# Patient Record
Sex: FEMALE | Race: WHITE | NOT HISPANIC OR LATINO | URBAN - METROPOLITAN AREA
[De-identification: names, ages, dates, MRNs, and addresses within clinical notes are randomized per-mention and may not be internally consistent; named-entity substitution may affect disease eponyms.]

---

## 2020-08-31 ENCOUNTER — OFFICE (OUTPATIENT)
Dept: URBAN - METROPOLITAN AREA TELEHEALTH 12 | Facility: TELEHEALTH | Age: 85
End: 2020-08-31
Payer: COMMERCIAL

## 2020-08-31 VITALS — HEIGHT: 61 IN | WEIGHT: 90 LBS

## 2020-08-31 DIAGNOSIS — K59.09 OTHER CONSTIPATION: ICD-10-CM

## 2020-08-31 DIAGNOSIS — F03.90 UNSPECIFIED DEMENTIA, UNSPECIFIED SEVERITY, WITHOUT BEHAVIOR: ICD-10-CM

## 2020-08-31 DIAGNOSIS — R10.84 GENERALIZED ABDOMINAL PAIN: ICD-10-CM

## 2020-08-31 PROCEDURE — 99204 OFFICE O/P NEW MOD 45 MIN: CPT | Mod: 95 | Performed by: INTERNAL MEDICINE

## 2020-08-31 RX ORDER — LINACLOTIDE 145 UG/1
CAPSULE, GELATIN COATED ORAL
Qty: 30 | Refills: 2 | Status: ACTIVE
Start: 2020-08-31

## 2020-08-31 NOTE — SERVICEHPINOTES
PATIENT VERIFIED BY DATE OF BIRTH AND NAME. Patient has been consented for this telecommunication visit.   NAOMIE KASPER   is a   85   year old    female who is being seen in consultation at the request of   BRANDYN ANDINO   for constipation off an on for a year.  Patient's daughter is with her speaking for her.  Daughter is a pediatric nurse.  Ms. Kasper has had headaches, issues with high serotonin levels, so SSRI was stopped due to this and she was also found to have hyponatremia, so has been limiting her fluid intake, which is 50 cc/ day of water.  She had lost weight and thus she is now also on Remeron, plus Wellbutrin.  She has had dementia and they think there may be Alzheimer's playing a role.  BRThe bowel issues are as follows:  She has BSS 1 about once per day.  She had tried Milk of Mag, which helps the best but daughter is scared to do this daily as she doesn't want to "go overboard."  But she wants her to have relief and this is her main concern.  Sometimes her belly looks like she is 3 mos pregnant.  They went to the ED 3 days ago because of the extreme pains and was given enemas.  CT didn't show other acute abnormalities except pan-diverticulosis.  She has been to the ED four times, usually all related to headaches.  Ede Kasper also has problems with nausea, without vomiting.  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  Her last colonoscopy was 10 years ago.

## 2020-09-22 ENCOUNTER — OFFICE (OUTPATIENT)
Dept: URBAN - METROPOLITAN AREA TELEHEALTH 12 | Facility: TELEHEALTH | Age: 85
End: 2020-09-22
Payer: COMMERCIAL

## 2020-09-22 VITALS — WEIGHT: 87 LBS | HEIGHT: 61 IN

## 2020-09-22 DIAGNOSIS — F03.90 UNSPECIFIED DEMENTIA, UNSPECIFIED SEVERITY, WITHOUT BEHAVIOR: ICD-10-CM

## 2020-09-22 DIAGNOSIS — R10.84 GENERALIZED ABDOMINAL PAIN: ICD-10-CM

## 2020-09-22 DIAGNOSIS — K59.09 OTHER CONSTIPATION: ICD-10-CM

## 2020-09-22 PROCEDURE — 99214 OFFICE O/P EST MOD 30 MIN: CPT | Mod: 95

## 2020-09-22 NOTE — SERVICEHPINOTES
PATIENT VERIFIED BY DATE OF BIRTH AND NAME. Patient has been consented for this telecommunication visit.  NAOMIE KASPER   is a   85  female with history of dementia who complains of abdominal pain. Last seen here 3 weeks ago for constipation/abd pain. She reports ongoing abdominal pain. She has had chronic abdominal pain for over 1 year, has not changed. Reports hypgastric "burning" pain, worse in the AM upon first waking, or pain can wake her up in the morning. Seems to improve with meals and is better in the evenings. Daughter thinks her symptoms may be related to anxiety as well. She has been having regular, daily BMs without any improvement in pain. Tried linzess which caused a lot of diarrhea throughout the day, so now just taking metamucil/miralax which seems to be working. Pain does not seem to improve after BMs. She has again been to the ER since last visit due to pain--CT on 9/16 was unremarkable. She has some nausea possibly related to anxiety, but no vomiting. No acid reflux.All 10 point review of systems have been reviewed as per HPI and otherwise negative.